# Patient Record
Sex: FEMALE | Race: WHITE
[De-identification: names, ages, dates, MRNs, and addresses within clinical notes are randomized per-mention and may not be internally consistent; named-entity substitution may affect disease eponyms.]

---

## 2019-01-05 ENCOUNTER — HOSPITAL ENCOUNTER (EMERGENCY)
Dept: HOSPITAL 63 - ER | Age: 24
Discharge: HOME | End: 2019-01-05
Payer: COMMERCIAL

## 2019-01-05 VITALS — BODY MASS INDEX: 20.94 KG/M2 | HEIGHT: 66 IN | WEIGHT: 130.29 LBS

## 2019-01-05 VITALS — DIASTOLIC BLOOD PRESSURE: 83 MMHG | SYSTOLIC BLOOD PRESSURE: 131 MMHG

## 2019-01-05 DIAGNOSIS — O36.4XX0: Primary | ICD-10-CM

## 2019-01-05 DIAGNOSIS — Z3A.10: ICD-10-CM

## 2019-01-05 LAB
BASOPHILS # BLD AUTO: 0 X10^3/UL (ref 0–0.2)
BASOPHILS NFR BLD: 0 % (ref 0–3)
EOSINOPHIL NFR BLD: 0 % (ref 0–3)
EOSINOPHIL NFR BLD: 0 X10^3/UL (ref 0–0.7)
ERYTHROCYTE [DISTWIDTH] IN BLOOD BY AUTOMATED COUNT: 12.9 % (ref 11.5–14.5)
HCT VFR BLD CALC: 38.3 % (ref 36–47)
HGB BLD-MCNC: 13.1 G/DL (ref 12–15.5)
LYMPHOCYTES # BLD: 1.7 X10^3/UL (ref 1–4.8)
LYMPHOCYTES NFR BLD AUTO: 22 % (ref 24–48)
MCH RBC QN AUTO: 30 PG (ref 25–35)
MCHC RBC AUTO-ENTMCNC: 34 G/DL (ref 31–37)
MCV RBC AUTO: 89 FL (ref 79–100)
MONO #: 0.4 X10^3/UL (ref 0–1.1)
MONOCYTES NFR BLD: 5 % (ref 0–9)
NEUT #: 5.7 X10^3UL (ref 1.8–7.7)
NEUTROPHILS NFR BLD AUTO: 73 % (ref 31–73)
PLATELET # BLD AUTO: 217 X10^3/UL (ref 140–400)
RBC # BLD AUTO: 4.3 X10^6/UL (ref 3.5–5.4)
WBC # BLD AUTO: 7.9 X10^3/UL (ref 4–11)

## 2019-01-05 PROCEDURE — 85025 COMPLETE CBC W/AUTO DIFF WBC: CPT

## 2019-01-05 PROCEDURE — 84702 CHORIONIC GONADOTROPIN TEST: CPT

## 2019-01-05 PROCEDURE — 76805 OB US >/= 14 WKS SNGL FETUS: CPT

## 2019-01-05 PROCEDURE — 36415 COLL VENOUS BLD VENIPUNCTURE: CPT

## 2019-01-05 PROCEDURE — 86901 BLOOD TYPING SEROLOGIC RH(D): CPT

## 2019-01-05 PROCEDURE — 86900 BLOOD TYPING SEROLOGIC ABO: CPT

## 2019-01-05 PROCEDURE — 76817 TRANSVAGINAL US OBSTETRIC: CPT

## 2019-01-05 NOTE — RAD
Obstetrical ultrasound

 

HISTORY: Vaginal bleeding for one day. Low back pain for one day. Pregnant

with estimated clinical age of 14 weeks 4 days.

 

FINDINGS:

Uterus measures 9.4 x 3.4 x 5.9 cm. Gestational sac identified with a 

fetal pole. Crown rump length measurement is consistent with a fetal age 

of 10 weeks 5 days. Despite careful observation, fetal heart tones cannot 

be detected.

 

Maternal ovaries are not visualized.

 

IMPRESSION: Intrauterine fetus identified with crown-rump length 

measurements corresponding with 10 weeks 5 days. However, fetal heart 

tones cannot be detected despite careful observation. Findings are 

therefore concerning for failed pregnancy or fetal demise. Correlate 

clinically and with quantitative beta hCG values.

 

Electronically signed by: Geovanny Metcalf MD (1/5/2019 12:58 PM) [facility]

## 2019-01-05 NOTE — PHYS DOC
Past History


Past Medical History:  No Pertinent History


Past Surgical History:  


Smoking:  Non-smoker


Alcohol Use:  None


Drug Use:  None





Adult General


Chief Complaint


Chief Complaint:  VAGINAL BLEEDING PREGNANCY





HPI


HPI





Patient is a 23 year old female who presents with complaining of vaginal 

bleeding during pregnancy. Patient is  A1 at 14 weeks of gestation with 

LMP of 2018 with complaining of small amount of vaginal bleeding 

after intercourse this morning abdominal pain, nausea and vomiting, fever and 

chills, urinary symptom. Patient denies history of vaginal bleeding during her 

pregnancy. Patient moved from [place] to this area and has appointment with 

new OB/GYN on  care. Patient had ultrasound in [place] several 

weeks ago with intrauterine gestational sac. She does not know about her blood 

type.





Review of Systems


Review of Systems





Constitutional: Denies fever or chills []


Eyes: Denies change in visual acuity, redness, or eye pain []


HENT: Denies nasal congestion or sore throat []


Respiratory: Denies cough or shortness of breath []


Cardiovascular: No additional information not addressed in HPI []


GI: Denies abdominal pain, nausea, vomiting, bloody stools or diarrhea []


: Denies dysuria or hematuria []


Musculoskeletal: Denies back pain or joint pain []


Integument: Denies rash or skin lesions []


Neurologic: Denies headache, focal weakness or sensory changes []


Endocrine: Denies polyuria or polydipsia []





All other systems were reviewed and found to be within normal limits, except as 

documented in this note.





Allergies


Allergies





Allergies








Coded Allergies Type Severity Reaction Last Updated Verified


 


  No Known Drug Allergies    19 No











Physical Exam


Physical Exam





Constitutional: Well developed, well nourished, no acute distress, non-toxic 

appearance. []


HENT: Normocephalic, atraumatic. []


Eyes: PERRLA, EOMI, conjunctiva normal, no discharge. [] 


Neck: Normal range of motion, no tenderness, supple, no stridor. [] 


Cardiovascular:Heart rate regular rhythm, no murmur []


Lungs & Thorax:  Bilateral breath sounds clear to auscultation []


Abdomen: Bowel sounds normal, soft, no tenderness, no masses, no pulsatile 

masses. Vaginal exam in present of chaperone showed normal external vagina, 

brownish discharge in vagina without tenderness.[] 


Skin: Warm, dry, no erythema, no rash. [] 


Back: No tenderness, no CVA tenderness. [] 


Extremities: No tenderness, no cyanosis, no clubbing, ROM intact, no edema. [] 


Neurologic: Alert and oriented X 3, normal motor function, normal sensory 

function, no focal deficits noted. []


Psychologic: Affect normal, judgement normal, mood normal. []





Current Patient Data


Vital Signs





 Vital Signs








  Date Time  Temp Pulse Resp B/P (MAP) Pulse Ox O2 Delivery O2 Flow Rate FiO2


 


19 11:12   20  98 Room Air  


 


19 10:24 98.4 83      








Lab Results





 Laboratory Tests








Test


 19


10:35


 


White Blood Count


 7.9 x10^3/uL


(4.0-11.0)


 


Red Blood Count


 4.30 x10^6/uL


(3.50-5.40)


 


Hemoglobin


 13.1 g/dL


(12.0-15.5)


 


Hematocrit


 38.3 %


(36.0-47.0)


 


Mean Corpuscular Volume


 89 fL ()





 


Mean Corpuscular Hemoglobin 30 pg (25-35)  


 


Mean Corpuscular Hemoglobin


Concent 34 g/dL


(31-37)


 


Red Cell Distribution Width


 12.9 %


(11.5-14.5)


 


Platelet Count


 217 x10^3/uL


(140-400)


 


Neutrophils (%) (Auto) 73 % (31-73)  


 


Lymphocytes (%) (Auto) 22 % (24-48)  L


 


Monocytes (%) (Auto) 5 % (0-9)  


 


Eosinophils (%) (Auto) 0 % (0-3)  


 


Basophils (%) (Auto) 0 % (0-3)  


 


Neutrophils # (Auto)


 5.7 x10^3uL


(1.8-7.7)


 


Lymphocytes # (Auto)


 1.7 x10^3/uL


(1.0-4.8)


 


Monocytes # (Auto)


 0.4 x10^3/uL


(0.0-1.1)


 


Eosinophils # (Auto)


 0.0 x10^3/uL


(0.0-0.7)


 


Basophils # (Auto)


 0.0 x10^3/uL


(0.0-0.2)


 


Maternal Serum HCG Beta


Subunit 5591 mIU/mL


(0-6)  H











EKG


EKG


[]





Radiology/Procedures


Radiology/Procedures


 [facility] [address], [place] [address]


 [phone]


 


 IMAGING REPORT





 Signed





PATIENT: NADINE FOX ACCOUNT: [id number] MRN#: [id number]


: 1995 LOCATION: ER AGE: 23


SEX: F EXAM DT: 19 ACCESSION#: [id number]


STATUS: REG ER ORD. PHYSICIAN: PAUL JOSEPH MD 


REASON: vaginal bleeding


PROCEDURE: OB > 14 WKS W/TV





Obstetrical ultrasound


 


HISTORY: Vaginal bleeding for one day. Low back pain for one day. Pregnant


with estimated clinical age of 14 weeks 4 days.


 


FINDINGS:


Uterus measures 9.4 x 3.4 x 5.9 cm. Gestational sac identified with a 


fetal pole. Crown rump length measurement is consistent with a fetal age 


of 10 weeks 5 days. Despite careful observation, fetal heart tones cannot 


be detected.


 


Maternal ovaries are not visualized.


 


IMPRESSION: Intrauterine fetus identified with crown-rump length 


measurements corresponding with 10 weeks 5 days. However, fetal heart 


tones cannot be detected despite careful observation. Findings are 


therefore concerning for failed pregnancy or fetal demise. Correlate 


clinically and with quantitative beta hCG values.


 


Electronically signed by: Geovanny Metcalf MD (2019 12:58 PM) [facility]














DICTATED AND SIGNED BY:     GEOVANNY METCALF MD


DATE:     19 1253





CC: PAUL JOSEPH MD; PCP,NO ~





Course & Med Decision Making


Course & Med Decision Making


Pertinent Labs and Imaging studies reviewed. (See chart for details)





Evaluation of patient in ER showed 23-year-old male patient with vaginal 

bleeding at 14 weeks of gestation. Patient had blood type of A+ with a stable 

vital signs and hemoglobin. OB ultrasound did not show total heart rate with 

concern for fetal demise. Patient's on-call OB/GYN Dr Kumari was consulted at 

1306 and recommended to discharge patient from our care and patient following 

up with him today at [facility] for D&C. Verbal report was 

given to Dr. Jg BHATIA on-call at [facility] at 1319. 

Patient discharged at stable condition and recommended to go to [facility] without eating or drinking anything.





Dragon Disclaimer


Dragon Disclaimer


This electronic medical record was generated, in whole or in part, using a 

voice recognition dictation system.





Departure


Departure:


Impression:  


 Primary Impression:  


 Fetal demise before 20 weeks with retention of dead fetus


Disposition:   HOME, SELF-CARE (at 1321 to [facility])


Condition:  STABLE


Referrals:  


PCP,NO (PCP)


Patient Instructions:  Intrauterine Fetal Demise





Additional Instructions:  


Follow-up with [facility] emergency room now for  D&C by 

Dr. Kumari


Do not eat or drink anything











PAUL JOSEPH MD 2019 12:44

## 2019-03-29 ENCOUNTER — HOSPITAL ENCOUNTER (EMERGENCY)
Dept: HOSPITAL 63 - ER | Age: 24
Discharge: HOME | End: 2019-03-29
Payer: COMMERCIAL

## 2019-03-29 VITALS — BODY MASS INDEX: 19.77 KG/M2 | HEIGHT: 66 IN | WEIGHT: 123 LBS

## 2019-03-29 VITALS — SYSTOLIC BLOOD PRESSURE: 106 MMHG | DIASTOLIC BLOOD PRESSURE: 57 MMHG
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
## 2019-03-29 DIAGNOSIS — S61.217A: Primary | ICD-10-CM
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
## 2019-03-29 PROCEDURE — 12001 RPR S/N/AX/GEN/TRNK 2.5CM/<: CPT

## 2019-03-29 PROCEDURE — 99283 EMERGENCY DEPT VISIT LOW MDM: CPT

## 2019-03-29 NOTE — PHYS DOC
Past History


Past Medical History:  No Pertinent History


Past Surgical History:  


Smoking:  Non-smoker


Alcohol Use:  None


Drug Use:  None





Adult General


Chief Complaint


Chief Complaint:  LACERATION/AVULSION





HPI


HPI


23-year-old female presents with left little finger laceration. The patient was 

using a knife when she started with the cutting board the knife slipped and 

lacerated her left little finger. It was a significant cut immediately started 

to bleed. She is concerned she may need sutures so she came to the emergency 

room. She denies any other injuries. Her last tetanus vaccine was 2 years ago.





Review of Systems


Review of Systems





Constitutional: Denies fever or chills []


Eyes: Denies change in visual acuity, redness, or eye pain []


HENT: Denies nasal congestion or sore throat []


Respiratory: Denies cough or shortness of breath []


Cardiovascular: No additional information not addressed in HPI []


GI: Denies abdominal pain, nausea, vomiting, bloody stools or diarrhea []


: Denies dysuria or hematuria []


Musculoskeletal: Denies back pain or joint pain []


Integument: Laceration of the left little finger[]


Neurologic: Denies headache, focal weakness or sensory changes []


Endocrine: Denies polyuria or polydipsia []





All other systems were reviewed and found to be within normal limits, except as 

documented in this note.





Current Medications


Current Medications





Current Medications








 Medications


  (Trade)  Dose


 Ordered  Sig/Romain  Start Time


 Stop Time Status Last Admin


Dose Admin


 


 Ondansetron HCl


  (Zofran Odt)  4 mg  1X  ONCE  3/29/19 09:30


 3/29/19 09:31 DC 3/29/19 09:24


4 MG











Allergies


Allergies





Allergies








Coded Allergies Type Severity Reaction Last Updated Verified


 


  No Known Drug Allergies    19 No











Physical Exam


Physical Exam





Constitutional: Well developed, well nourished, no acute distress, non-toxic 

appearance. []


HENT: Normocephalic, atraumatic, bilateral external ears normal, oropharynx 

moist, no oral exudates, nose normal. []


Eyes: PERRLA, EOMI, conjunctiva normal, no discharge. [] 


Neck: Normal range of motion, no tenderness, supple, no stridor. [] 


Cardiovascular:Heart rate regular rhythm, no murmur []


Lungs & Thorax:  Bilateral breath sounds clear to auscultation []


Abdomen: Bowel sounds normal, soft, no tenderness, no masses, no pulsatile 

masses. [] 


Skin: One centimeter semicircular laceration of the left little finger volar 

side.[] 


Back: No tenderness, no CVA tenderness. [] 


Extremities: No tenderness, no cyanosis, no clubbing, ROM intact, no edema. [] 


Neurologic: Alert and oriented X 3, normal motor function, normal sensory 

function, no focal deficits noted. []


Psychologic: Affect normal, judgement normal, mood normal. []





Current Patient Data


Vital Signs





 Vital Signs








  Date Time  Temp Pulse Resp B/P (MAP) Pulse Ox O2 Delivery O2 Flow Rate FiO2


 


3/29/19 08:37 97.9 55 16  99 Room Air  











EKG


EKG


[]





Radiology/Procedures


Radiology/Procedures


[]





Course & Med Decision Making


Course & Med Decision Making


Pertinent Labs and Imaging studies reviewed. (See chart for details)


The patient's laceration was a near avulsion of the skin. This flap is highly 

unlikely to survive. I did Dermabond down the edges of the flap for protection 

of the underlying bed. He repair note for more details. The patient was 

nauseous and she was given 4 mg of Zofran ODT. She is stable for discharge at 

this time.


[]





Dragon Disclaimer


Dragon Disclaimer


This electronic medical record was generated, in whole or in part, using a 

voice recognition dictation system.





Laceration Repair


Lac Repair


Indication: []Semicircular laceration of the left little finger





Procedure: Verbal consent was obtained to attempt to repair this laceration if 

indicated. I used 1% lidocaine without epinephrine to numb the area. Once 

anesthesia was achieved, more thorough irrigation of the area was performed 

with saline under pressure. No foreign bodies were found.  The flap was 

unlikely to be viable, but would provide protection for the wound. I applied 

Dermabond to the edges of the wound to help keep it in place. Bleeding was 

controlled. A pressure dressing was applied. 





Total repaired wound length: 1 Centimeter. 





Other Items: None





The patient tolerated the procedure well.





Complications: Nausea, but no vomiting.





Departure


Departure:


Impression:  


 Primary Impression:  


 Laceration of left little finger


Disposition:   HOME, SELF-CARE


Condition:  IMPROVED


Referrals:  


PCP,NO (PCP)


Patient Instructions:  Fingertip Laceration





Problem Qualifiers








 Primary Impression:  


 Laceration of left little finger


 Encounter type:  initial encounter  Damage to nail status:  without damage  

Foreign body presence:  without foreign body  Qualified Codes:  S61.217A - 

Laceration without foreign body of left little finger without damage to nail, 

initial encounter








SATNAM MISHRA DO Mar 29, 2019 09:37

## 2021-07-12 ENCOUNTER — HOSPITAL ENCOUNTER (EMERGENCY)
Dept: HOSPITAL 63 - ER | Age: 26
Discharge: HOME | End: 2021-07-12
Payer: COMMERCIAL

## 2021-07-12 VITALS — SYSTOLIC BLOOD PRESSURE: 130 MMHG | DIASTOLIC BLOOD PRESSURE: 79 MMHG

## 2021-07-12 VITALS — WEIGHT: 122.58 LBS | BODY MASS INDEX: 19.7 KG/M2 | HEIGHT: 66 IN

## 2021-07-12 DIAGNOSIS — B34.9: ICD-10-CM

## 2021-07-12 DIAGNOSIS — U07.1: Primary | ICD-10-CM

## 2021-07-12 LAB
% BANDS: 9 % (ref 0–9)
% LYMPHS: 50 % (ref 24–48)
% METAS: 1 % (ref 0–0)
% MONOS: 12 % (ref 0–10)
% MYELOS: 1 % (ref 0–0)
% SEGS: 25 % (ref 35–66)
ANION GAP SERPL CALC-SCNC: 10 MMOL/L (ref 6–14)
BASOPHILS # BLD AUTO: 0 X10^3/UL (ref 0–0.2)
BASOPHILS NFR BLD: 1 % (ref 0–3)
CA-I SERPL ISE-MCNC: 10 MG/DL (ref 7–20)
CALCIUM SERPL-MCNC: 8.3 MG/DL (ref 8.5–10.1)
CHLORIDE SERPL-SCNC: 106 MMOL/L (ref 98–107)
CO2 SERPL-SCNC: 26 MMOL/L (ref 21–32)
CREAT SERPL-MCNC: 0.8 MG/DL (ref 0.6–1)
EOSINOPHIL NFR BLD AUTO: 2 % (ref 0–5)
EOSINOPHIL NFR BLD: 0 X10^3/UL (ref 0–0.7)
EOSINOPHIL NFR BLD: 1 % (ref 0–3)
ERYTHROCYTE [DISTWIDTH] IN BLOOD BY AUTOMATED COUNT: 14.3 % (ref 11.5–14.5)
GFR SERPLBLD BASED ON 1.73 SQ M-ARVRAT: 88.9 ML/MIN
GLUCOSE SERPL-MCNC: 99 MG/DL (ref 70–99)
HCT VFR BLD CALC: 37.2 % (ref 36–47)
HGB BLD-MCNC: 12.1 G/DL (ref 12–15.5)
LYMPHOCYTES # BLD: 1 X10^3/UL (ref 1–4.8)
LYMPHOCYTES NFR BLD AUTO: 46 % (ref 24–48)
MCH RBC QN AUTO: 28 PG (ref 25–35)
MCHC RBC AUTO-ENTMCNC: 32 G/DL (ref 31–37)
MCV RBC AUTO: 88 FL (ref 79–100)
MONO #: 0.2 X10^3/UL (ref 0–1.1)
MONOCYTES NFR BLD: 12 % (ref 0–9)
NEUT #: 0.9 X10^3UL (ref 1.8–7.7)
NEUTROPHILS NFR BLD AUTO: 41 % (ref 31–73)
PLATELET # BLD AUTO: 154 X10^3/UL (ref 140–400)
PLATELET # BLD EST: ADEQUATE 10*3/UL
POTASSIUM SERPL-SCNC: 4.1 MMOL/L (ref 3.5–5.1)
RBC # BLD AUTO: 4.25 X10^6/UL (ref 3.5–5.4)
SODIUM SERPL-SCNC: 142 MMOL/L (ref 136–145)
WBC # BLD AUTO: 2.1 X10^3/UL (ref 4–11)

## 2021-07-12 PROCEDURE — 80048 BASIC METABOLIC PNL TOTAL CA: CPT

## 2021-07-12 PROCEDURE — 85007 BL SMEAR W/DIFF WBC COUNT: CPT

## 2021-07-12 PROCEDURE — U0003 INFECTIOUS AGENT DETECTION BY NUCLEIC ACID (DNA OR RNA); SEVERE ACUTE RESPIRATORY SYNDROME CORONAVIRUS 2 (SARS-COV-2) (CORONAVIRUS DISEASE [COVID-19]), AMPLIFIED PROBE TECHNIQUE, MAKING USE OF HIGH THROUGHPUT TECHNOLOGIES AS DESCRIBED BY CMS-2020-01-R: HCPCS

## 2021-07-12 PROCEDURE — 85025 COMPLETE CBC W/AUTO DIFF WBC: CPT

## 2021-07-12 PROCEDURE — C9803 HOPD COVID-19 SPEC COLLECT: HCPCS

## 2021-07-12 PROCEDURE — 71046 X-RAY EXAM CHEST 2 VIEWS: CPT

## 2021-07-12 PROCEDURE — 99284 EMERGENCY DEPT VISIT MOD MDM: CPT

## 2021-07-12 NOTE — RAD
EXAM: Chest, 2 views.



HISTORY: Cough.



COMPARISON: None.



FINDINGS: 2 views of the chest are obtained. There is no infiltrate, pleural effusion or pneumothorax
. The heart is normal in size.



IMPRESSION: No acute pulmonary finding.



Electronically signed by: Radha Chen MD (7/12/2021 1:01 PM) [id number]

## 2021-07-12 NOTE — PHYS DOC
Past History


Past Medical History:  No Pertinent History


Past Surgical History:  


Smoking:  Non-smoker


Alcohol Use:  None


Drug Use:  None





General Adult


EDM:


Chief Complaint:  COUGH





HPI:


HPI:


Patient is a 25-year-old female being seen in the ER today for nonproductive 

cough, headache, high pain, body aches, loss of smell that started 4 days ago.  

Patient rates her pain 6 out of 10.  She took Advil last night but no treatment 

today.  Patient is not vaccinated for COVID-19.  She is a  so she 

is unsure of any sick exposures.  Patient states that her father is a paramedic 

and her that she need to be tested for meningitis because she has got a headache

and body aches.  Patient denies nasal congestion drainage, neck stiffness, 

shortness of breath, sore throat, loss of taste, fevers.





Review of Systems:


Review of Systems:


14 body systems of the review of systems have been reviewed.  See HPI for pe

rtinent positive and negative responses, otherwise all other systems are 

negative, nonpertinent or noncontributory





Allergies:


Allergies:





Allergies








Coded Allergies Type Severity Reaction Last Updated Verified


 


  No Known Drug Allergies    19 No











Physical Exam:


PE:





Constitutional: Well developed, well nourished, no acute distress, non-toxic 

appearance. []


HENT: Normocephalic, atraumatic, bilateral external ears normal, oropharynx 

moist with mild erythema, postnasal drainage, no oral exudates, nose normal, no 

pain with palpation of sinuses. []


Eyes: PERRLA, EOMI, conjunctiva normal, no discharge. [] 


Neck: Normal range of motion, no tenderness, supple, no stridor, no cervical 

lymphadenopathy. [] 


Cardiovascular:Heart rate regular rhythm, no murmur []


Lungs & Thorax:  Bilateral breath sounds clear to auscultation []


Abdomen: Bowel sounds normal, soft, no tenderness, no masses, no pulsatile 

masses. [] 


Skin: Warm, dry, no erythema, no rash. [] 


Back: No tenderness, normal range of motion [] 


Extremities: No tenderness, no cyanosis, no clubbing, ROM intact, no edema. [] 


Neurologic: Alert and oriented X 3, normal motor function, normal sensory 

function, no focal deficits noted. []


Psychologic: Affect normal, judgement normal, mood normal. []





Current Patient Data:


Labs:





Laboratory Tests








Test


 21


13:05


 


White Blood Count 2.1 x10^3/uL 


 


Red Blood Count 4.25 x10^6/uL 


 


Hemoglobin 12.1 g/dL 


 


Hematocrit 37.2 % 


 


Mean Corpuscular Volume 88 fL 


 


Mean Corpuscular Hemoglobin 28 pg 


 


Mean Corpuscular Hemoglobin


Concent 32 g/dL 





 


Red Cell Distribution Width 14.3 % 


 


Platelet Count 154 x10^3/uL 


 


Neutrophils (%) (Auto) 41 % 


 


Lymphocytes (%) (Auto) 46 % 


 


Monocytes (%) (Auto) 12 % 


 


Eosinophils (%) (Auto) 1 % 


 


Basophils (%) (Auto) 1 % 


 


Neutrophils # (Auto) 0.9 x10^3uL 


 


Lymphocytes # (Auto) 1.0 x10^3/uL 


 


Monocytes # (Auto) 0.2 x10^3/uL 


 


Eosinophils # (Auto) 0.0 x10^3/uL 


 


Basophils # (Auto) 0.0 x10^3/uL 


 


Platelet Estimate Pending 


 


Sodium Level 142 mmol/L 


 


Potassium Level 4.1 mmol/L 


 


Chloride Level 106 mmol/L 


 


Carbon Dioxide Level 26 mmol/L 


 


Anion Gap 10 


 


Blood Urea Nitrogen 10 mg/dL 


 


Creatinine 0.8 mg/dL 


 


Estimated GFR


(Cockcroft-Gault) 88.9 





 


Glucose Level 99 mg/dL 


 


Calcium Level 8.3 mg/dL 











EKG:


EKG:


[]





Radiology/Procedures:


Radiology/Procedures:


PROCEDURE: CHEST PA & LATERAL





EXAM: Chest, 2 views.





HISTORY: Cough.





COMPARISON: None.





FINDINGS: 2 views of the chest are obtained. There is no infiltrate, pleural 

effusion or pneumothorax. The heart is normal in size.





IMPRESSION: No acute pulmonary finding.





Electronically signed by: Radha Chen MD (2021 1:01 PM) [id number]














DICTATED AND SIGNED BY:     RADHA CHEN MD


DATE:     21 1307





CC: EMERGENCY,DEPARTMENT; JOSEFA CHÁVEZ APRN; PCP,NO ~MTH0 0





Heart Score:


C/O Chest Pain:  No


Risk Factors:


Risk Factors:  DM, Current or recent (<one month) smoker, HTN, HLP, family 

history of CAD, obesity.


Risk Scores:


Score 0 - 3:  2.5% MACE over next 6 weeks - Discharge Home


Score 4 - 6:  20.3% MACE over next 6 weeks - Admit for Clinical Observation


Score 7 - 10:  72.7% MACE over next 6 weeks - Early Invasive Strategies





Course & Med Decision Making:


Course & Med Decision Making


Pertinent Labs and Imaging studies reviewed. (See chart for details)





Patient is a 25-year-old female being seen in the ER today for nonproductive 

cough, headache, eye pain, loss of smell, body aches.  Patient reports that she 

told her father who is a paramedic that she has a headache and body aches and he

 told her to be tested for meningitis.  Patient has no meningeal symptoms.  No 

fever, neck stiffness, altered mental status, nausea, vomiting, focal neuro 

deficit, nuchal rigidity.  It is likely that patient has COVID-19 viral illness.

  Patient tested for COVID-19 and will receive the results of that testing in 1 

to 2 days.  Blood work also obtained, this was mostly unremarkable with findings

 consistent with a viral illness.  Patient had a chest x-ray performed and that 

was negative for any acute findings.  I discussed with patient the possibility 

of a COVID-19 viral illness.  Self-isolation was discussed and patient referred 

to CDC guidelines.  I discussed with patient all findings and diagnostic testing

 as well as the need to follow-up with PCP for further evaluation and treatment 

or return to the ER if any new or worsening symptoms.  Strict return precautions

 were also discussed at length.  Patient voiced understanding and agreement with

 the plan.  Patient is hemodynamically stable at the time of disposition.





Dragon Disclaimer:


Dragon Disclaimer:


This electronic medical record was generated, in whole or in part, using a voice

 recognition dictation system.





Departure


Departure:


Impression:  


   Primary Impression:  


   Viral illness


Disposition:   HOME / SELF CARE / HOMELESS


Condition:  GOOD


Referrals:  


PCP,NO (PCP)


Patient Instructions:  Cough, Adult





Additional Instructions:  


You were seen in the ER today for headache, eye pain, body aches, and cough.  

Your physical exam was reassuring.  Your chest x-ray was normal.  Your blood 

work was unremarkable but did show indication of a viral illness.  You can take 

Tylenol or ibuprofen for your headache.  We tested you for COVID-19 but this 

test does not come back for 1 to 2 days.  In the meantime you will need to 

quarantine yourself at home away from all other individuals, especially those 

who are elderly or have any other chronic health issues or any one with 

immunocompromise status.  You should return to the ER if you develop worsening 

cough, shortness of breath, chest pain, or any other new or concerning symptoms.

  Alternate Tylenol and ibuprofen as needed for your body aches and pain.  If 

your test does come back positive we will need to quarantine yourself for 10 

days until symptom free.  You should make sure to drink plenty of fluids and get

 plenty of rest.











JOSEFA CHÁVEZ APRN          2021 12:43

## 2021-11-14 ENCOUNTER — HOSPITAL ENCOUNTER (EMERGENCY)
Dept: HOSPITAL 63 - ER | Age: 26
Discharge: HOME | End: 2021-11-14
Payer: COMMERCIAL

## 2021-11-14 VITALS — SYSTOLIC BLOOD PRESSURE: 126 MMHG | DIASTOLIC BLOOD PRESSURE: 68 MMHG

## 2021-11-14 VITALS — HEIGHT: 66 IN | WEIGHT: 122.58 LBS | BODY MASS INDEX: 19.7 KG/M2
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
## 2021-11-14 DIAGNOSIS — S60.041A: Primary | ICD-10-CM
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
## 2021-11-14 PROCEDURE — 73130 X-RAY EXAM OF HAND: CPT

## 2021-11-14 PROCEDURE — 99283 EMERGENCY DEPT VISIT LOW MDM: CPT

## 2021-11-14 NOTE — RAD
Right hand 3 views.



HISTORY: Smashed in car door, pain



3 views were taken of the right hand. There is not evidence of an acute fracture or osseous abnormali
ty.



IMPRESSION:

1. No acute fracture noted in the right hand.



Electronically signed by: Alejandro Wilson MD (11/14/2021 2:50 PM) [facility]

## 2021-11-14 NOTE — PHYS DOC
Past History


Past Medical History:  No Pertinent History


 (JOSHUA WHITT APRN)


Past Surgical History:  , Other


Additional Past Surgical Histo:  D&C X 2


 (JOSHUA WHITT APRN)


Smoking:  Non-smoker


Alcohol Use:  None


Drug Use:  None


 (JOSHUA WHITT)





Adult General


Chief Complaint


Chief Complaint:  HAND PROBLEM





HPI


HPI





Patient is a 26-year-old female  who presents to the ED today 

complaining of right eye injury, patient states she accidentally smashed her 

right hand in a car door.  Patient states she is right-handed.


 (JOSHUA WHITT APRN)





Review of Systems


Review of Systems





Constitutional: Denies fever or chills 


Musculoskeletal: Reports right hand intermittent


Integument: Denies rash or skin lesions []


Neurologic: Denies headache, focal weakness or sensory changes []








All other systems were reviewed and found to be within normal limits, except as 

documented in this note.


 (JOSHUA WHITT APRFAYE)





Allergies


Allergies





Allergies








Coded Allergies Type Severity Reaction Last Updated Verified


 


  No Known Drug Allergies    19 No








 (JOSHUA WHITT)





Physical Exam


Physical Exam





Constitutional: Well developed, well nourished, no acute distress, non-toxic 

appearance. []


Skin: Warm, dry, no erythema, no rash. [] 


Back: No tenderness, no CVA tenderness. [] 


Extremities: Right hand with no obvious deformity, bruising noted on the right 

ring finger proximal phalanx with tenderness over the region.  Full range of 

motion to the right hand and fingers, adequate radial, medial, ulnar sensation 

to the right fingers.  +2 right radial pulse.  Cap refill less than 2 seconds to

 right fingers


Neurologic: Alert and oriented X 3, normal motor function, normal sensory 

function, no focal deficits noted. []


Psychologic: Affect normal, judgement normal, mood normal. []


 (JOSHUA WHITT APRN)





EKG


EKG


[]


 (JOSHUA WHITT APRFAYE)





Radiology/Procedures


Radiology/Procedures


[]PROCEDURE: HAND RIGHT 3V





Right hand 3 views.





HISTORY: Smashed in car door, pain





3 views were taken of the right hand. There is not evidence of an acute fracture

 or osseous abnormality.





IMPRESSION:


1. No acute fracture noted in the right hand.





Electronically signed by: Alejandro Wilson MD (2021 2:50 PM) [facility]














DICTATED AND SIGNED BY:     ALEJANDRO WILSON MD


DATE:     21 1449





CC: JOSHUA WHITT; PCP,NO ~MTH0 0


 (JOSHUA WHITT)





Heart Score


C/O Chest Pain:  N/A


Risk Factors:


Risk Factors:  DM, Current or recent (<one month) smoker, HTN, HLP, family 

history of CAD, obesity.


Risk Scores:


Risk Factors:  DM, Current or recent (<one month) smoker, HTN, HLP, family 

history of CAD, obesity.


 (JOSHUA WHITT)





Course & Med Decision Making


Course & Med Decision Making


Pertinent Labs and Imaging studies reviewed. (See chart for details)





This is a 26-year-old female patient presenting to the ED today complaining of 

right hand injury, she accidentally smashed her right hand in a car door.





Right hand x-rays interpreted by radiologist as negative for any acute findings.

  Discharge to home.  Follow-up with Ortho in 1 week.  Ice elevation encouraged.

  OTC pain relievers





 (JOSHUA WHITT)


Course & Med Decision Making


I was the Attending physician on the above date of service of this patient. This

 patient was evaluated, examined, treated, and dispositioned from the emergency 

department by the mid-level practitioner.  Although I was working at the time , 

no assistance was requested. 





Electronically signed, Thelma Mccullough DO





 (THELMA MCCULLOUGH DO)


Yuri Disclaimer


Dragon Disclaimer


This electronic medical record was generated, in whole or in part, using a voice

 recognition dictation system.


 (JOSHUA WHITT)





Departure


Departure:


Impression:  


   Primary Impression:  


   Contusion of right hand


Disposition:   HOME / SELF CARE / HOMELESS


Condition:  STABLE


Referrals:  


PCP,NO (PCP)








ANU KING Jr. DO


follow up in 1-2 weeks


Patient Instructions:  Contusion, Easy-to-Read





Additional Instructions:  


You were seen for right had injury, your right hand x-rays are negative for any 

acute findings.  Please try to ice and elevate the extremity.  Take 

over-the-counter pain relievers as needed.  Please follow-up with your own 

primary care doctor or the provided orthopedic doctor in 1 week if pain persists





Problem Qualifiers








   Primary Impression:  


   Contusion of right hand


   Encounter type:  initial encounter  Qualified Codes:  S60.221A - Contusion of

    right hand, initial encounter








JOSHUA WHITT            2021 14:26


THELMA MCCULLOUGH DO                 2021 06:57

## 2021-12-24 ENCOUNTER — HOSPITAL ENCOUNTER (EMERGENCY)
Dept: HOSPITAL 63 - ER | Age: 26
Discharge: ANOTHER HOSPITAL | End: 2021-12-24
Payer: COMMERCIAL

## 2021-12-24 VITALS — HEIGHT: 66 IN | BODY MASS INDEX: 19.31 KG/M2 | WEIGHT: 120.15 LBS

## 2021-12-24 VITALS — SYSTOLIC BLOOD PRESSURE: 101 MMHG | DIASTOLIC BLOOD PRESSURE: 94 MMHG

## 2021-12-24 DIAGNOSIS — Z20.822: ICD-10-CM

## 2021-12-24 DIAGNOSIS — Z98.890: ICD-10-CM

## 2021-12-24 DIAGNOSIS — Z3A.01: ICD-10-CM

## 2021-12-24 DIAGNOSIS — R10.84: ICD-10-CM

## 2021-12-24 DIAGNOSIS — O26.891: Primary | ICD-10-CM

## 2021-12-24 LAB
ALBUMIN SERPL-MCNC: 4 G/DL (ref 3.4–5)
ALBUMIN/GLOB SERPL: 1.1 {RATIO} (ref 1–1.7)
ALP SERPL-CCNC: 60 U/L (ref 46–116)
ALT SERPL-CCNC: 20 U/L (ref 14–59)
ANION GAP SERPL CALC-SCNC: 14 MMOL/L (ref 6–14)
APTT PPP: (no result) S
AST SERPL-CCNC: 16 U/L (ref 15–37)
BACTERIA #/AREA URNS HPF: (no result) /HPF
BASOPHILS # BLD AUTO: 0 X10^3/UL (ref 0–0.2)
BASOPHILS NFR BLD: 0 % (ref 0–3)
BILIRUB SERPL-MCNC: 0.8 MG/DL (ref 0.2–1)
BILIRUB UR QL STRIP: (no result)
BUN/CREAT SERPL: 20 (ref 6–20)
CA-I SERPL ISE-MCNC: 14 MG/DL (ref 7–20)
CALCIUM SERPL-MCNC: 9 MG/DL (ref 8.5–10.1)
CHLORIDE SERPL-SCNC: 100 MMOL/L (ref 98–107)
CO2 SERPL-SCNC: 24 MMOL/L (ref 21–32)
CREAT SERPL-MCNC: 0.7 MG/DL (ref 0.6–1)
EOSINOPHIL NFR BLD: 0 % (ref 0–3)
EOSINOPHIL NFR BLD: 0 X10^3/UL (ref 0–0.7)
ERYTHROCYTE [DISTWIDTH] IN BLOOD BY AUTOMATED COUNT: 13.2 % (ref 11.5–14.5)
FIBRINOGEN PPP-MCNC: (no result) MG/DL
GFR SERPLBLD BASED ON 1.73 SQ M-ARVRAT: 102.8 ML/MIN
GLOBULIN SER-MCNC: 3.5 G/DL (ref 2.2–3.8)
GLUCOSE SERPL-MCNC: 126 MG/DL (ref 70–99)
GLUCOSE UR STRIP-MCNC: (no result) MG/DL
HCT VFR BLD CALC: 39 % (ref 36–47)
HGB BLD-MCNC: 12.8 G/DL (ref 12–15.5)
HYALINE CASTS #/AREA URNS LPF: (no result) /HPF
LIPASE: 106 U/L (ref 73–393)
LYMPHOCYTES # BLD: 0.7 X10^3/UL (ref 1–4.8)
LYMPHOCYTES NFR BLD AUTO: 6 % (ref 24–48)
MCH RBC QN AUTO: 29 PG (ref 25–35)
MCHC RBC AUTO-ENTMCNC: 33 G/DL (ref 31–37)
MCV RBC AUTO: 89 FL (ref 79–100)
MONO #: 0.7 X10^3/UL (ref 0–1.1)
MONOCYTES NFR BLD: 6 % (ref 0–9)
NEUT #: 10.1 X10^3UL (ref 1.8–7.7)
NEUTROPHILS NFR BLD AUTO: 88 % (ref 31–73)
NITRITE UR QL STRIP: (no result)
PLATELET # BLD AUTO: 208 X10^3/UL (ref 140–400)
POTASSIUM SERPL-SCNC: 3.7 MMOL/L (ref 3.5–5.1)
PROT SERPL-MCNC: 7.5 G/DL (ref 6.4–8.2)
RBC # BLD AUTO: 4.38 X10^6/UL (ref 3.5–5.4)
RBC #/AREA URNS HPF: (no result) /HPF (ref 0–2)
SODIUM SERPL-SCNC: 138 MMOL/L (ref 136–145)
SP GR UR STRIP: 1.02
SQUAMOUS #/AREA URNS LPF: (no result) /LPF
UROBILINOGEN UR-MCNC: 2 MG/DL
WBC # BLD AUTO: 11.5 X10^3/UL (ref 4–11)
WBC #/AREA URNS HPF: (no result) /HPF (ref 0–4)

## 2021-12-24 PROCEDURE — 96376 TX/PRO/DX INJ SAME DRUG ADON: CPT

## 2021-12-24 PROCEDURE — 99285 EMERGENCY DEPT VISIT HI MDM: CPT

## 2021-12-24 PROCEDURE — 87086 URINE CULTURE/COLONY COUNT: CPT

## 2021-12-24 PROCEDURE — 96374 THER/PROPH/DIAG INJ IV PUSH: CPT

## 2021-12-24 PROCEDURE — 87426 SARSCOV CORONAVIRUS AG IA: CPT

## 2021-12-24 PROCEDURE — U0003 INFECTIOUS AGENT DETECTION BY NUCLEIC ACID (DNA OR RNA); SEVERE ACUTE RESPIRATORY SYNDROME CORONAVIRUS 2 (SARS-COV-2) (CORONAVIRUS DISEASE [COVID-19]), AMPLIFIED PROBE TECHNIQUE, MAKING USE OF HIGH THROUGHPUT TECHNOLOGIES AS DESCRIBED BY CMS-2020-01-R: HCPCS

## 2021-12-24 PROCEDURE — 83690 ASSAY OF LIPASE: CPT

## 2021-12-24 PROCEDURE — 76817 TRANSVAGINAL US OBSTETRIC: CPT

## 2021-12-24 PROCEDURE — C9803 HOPD COVID-19 SPEC COLLECT: HCPCS

## 2021-12-24 PROCEDURE — 96375 TX/PRO/DX INJ NEW DRUG ADDON: CPT

## 2021-12-24 PROCEDURE — 96361 HYDRATE IV INFUSION ADD-ON: CPT

## 2021-12-24 PROCEDURE — 87491 CHLMYD TRACH DNA AMP PROBE: CPT

## 2021-12-24 PROCEDURE — 87591 N.GONORRHOEAE DNA AMP PROB: CPT

## 2021-12-24 PROCEDURE — 80053 COMPREHEN METABOLIC PANEL: CPT

## 2021-12-24 PROCEDURE — 76801 OB US < 14 WKS SINGLE FETUS: CPT

## 2021-12-24 PROCEDURE — 81001 URINALYSIS AUTO W/SCOPE: CPT

## 2021-12-24 PROCEDURE — 85025 COMPLETE CBC W/AUTO DIFF WBC: CPT

## 2021-12-24 PROCEDURE — 84702 CHORIONIC GONADOTROPIN TEST: CPT

## 2021-12-24 NOTE — RAD
US OB <14 WKS +TV



Clinical Indication: Reason: LOW ABD PAIN IN PREG / Spl. Instructions:  / History: 



Comparison: Obstetric ultrasound, January 5, 2019.



TECHNIQUE: Real-time ultrasound imaging of the pelvis using transabdominal and transvaginal window is
 performed.



Findings: 

Uterus measures 8.9 x 5.5 x 5.3 cm.



There is moderate cul-de-sac free fluid. Fluid appears complicated. Outlined by free fluid dependentl
y in the cul-de-sac there is a round hyperechogenicity measuring up to 1 cm, nonspecific. Color Doppl
er interrogation is negative. The maternal ovaries are not visualized perhaps due to overlying bowel 
gas. The cervix length is adequate, the cervix is closed.



There is intrauterine gestational sac with normal shape. There is suggestion of a tiny perigestationa
l hemorrhage. In the gestational sac a yolk sac and fetal pole are identified.



Mean gestational sac diameter 1.9 cm, 7 weeks and 1 day.

Crown-rump length 1.1 cm, 6 weeks and 6 days.

Overall estimated sonographic gestational age 7 weeks and 0 days.

EDC ultrasound is August 12, 2022.

Estimated fetal heart rate is 169 bpm.



IMPRESSION:

1.  Single live intrauterine gestation, estimated sonographic gestational age is 7 weeks and 0 days.

2.  There is moderate cul-de-sac free fluid. The fluid appears complicated. There is a small round hy
perechogenicity partially outlined by free fluid. Finding is nonspecific. Recommend short-term follow
-up ultrasound.

3.  The maternal ovaries are not identified probably due to overlying bowel gas.



Electronically signed by: Dieter Shepard MD (12/24/2021 12:04 PM) [id number]

## 2021-12-24 NOTE — PHYS DOC
Past History


Past Medical History:  No Pertinent History


Past Surgical History:  , Other


Additional Past Surgical Histo:  D&C X 2


Smoking:  Non-smoker


Alcohol Use:  None


Drug Use:  None





General Adult


EDM:


Chief Complaint:  ABDOMINAL PAIN IN PREGNANCY





HPI:


HPI:





Patient is a 26-year-old female G6, P1 at approximately 7 weeks gestational age 

with generalized abdominal pain for 3 days.  Patient states is worsening.  Has 

some nausea but no vomiting.  Denies any diarrhea or constipation.  No urinary 

complaints.  Has had scant amount of yellowish vaginal discharge.





Review of Systems:


Review of Systems:


All other systems within normal limits except for as noted in the HPI





Current Medications:


Current Meds:





Current Medications








 Medications


  (Trade)  Dose


 Ordered  Sig/Romain  Start Time


 Stop Time Status Last Admin


Dose Admin


 


 Fentanyl Citrate


  (Fentanyl 2ml


 Vial)  75 mcg  1X  ONCE  21 12:45


 21 12:52 DC 21 12:57


75 MCG


 


 Ondansetron HCl


  (Zofran)  4 mg  1X  ONCE  21 09:30


 21 09:31 DC 21 10:27


4 MG


 


 Sodium Chloride  1,000 ml @ 


 1,000 mls/hr  1X  ONCE  21 12:30


 21 13:29  21 12:32


1,000 MLS/HR











Allergies:


Allergies:





Allergies








Coded Allergies Type Severity Reaction Last Updated Verified


 


  No Known Drug Allergies    21 No











Physical Exam:


PE:


Constitutional: Well developed, well nourished, no acute distress, non-toxic 

appearance. []


HENT: Normocephalic, atraumatic, bilateral external ears normal,  nose normal. 

[]


Eyes: PERRLA, conjunctiva normal, no discharge. [] 


Neck: No rigidity, supple, no stridor. [] 


Cardiovascular: Regular rate and rhythm, brisk cap refill []


Lungs & Thorax: Non labored symmetric respirations, no tachypnea or respiratory 

distress []


Abdomen: Soft, nondistended, generalized abdominal pain with guarding


Skin: Warm, dry, no erythema, no rash. [] 


Back: Unremarkable


Extremities: No deformities, range of motion grossly intact, no lower extremity 

edema [] 


Neurologic: Alert and oriented X 3, no focal deficits noted. []


Psychologic: Affect normal, judgement normal, mood normal. []





Current Patient Data:


Labs:





                                Laboratory Tests








Test


 21


10:23 21


10:24


 


White Blood Count


 11.5 x10^3/uL


(4.0-11.0)  H 





 


Red Blood Count


 4.38 x10^6/uL


(3.50-5.40) 





 


Hemoglobin


 12.8 g/dL


(12.0-15.5) 





 


Hematocrit


 39.0 %


(36.0-47.0) 





 


Mean Corpuscular Volume


 89 fL ()


 





 


Mean Corpuscular Hemoglobin 29 pg (25-35)   


 


Mean Corpuscular Hemoglobin


Concent 33 g/dL


(31-37) 





 


Red Cell Distribution Width


 13.2 %


(11.5-14.5) 





 


Platelet Count


 208 x10^3/uL


(140-400) 





 


Neutrophils (%) (Auto) 88 % (31-73)  H 


 


Lymphocytes (%) (Auto) 6 % (24-48)  L 


 


Monocytes (%) (Auto) 6 % (0-9)   


 


Eosinophils (%) (Auto) 0 % (0-3)   


 


Basophils (%) (Auto) 0 % (0-3)   


 


Neutrophils # (Auto)


 10.1 x10^3uL


(1.8-7.7)  H 





 


Lymphocytes # (Auto)


 0.7 x10^3/uL


(1.0-4.8)  L 





 


Monocytes # (Auto)


 0.7 x10^3/uL


(0.0-1.1) 





 


Eosinophils # (Auto)


 0.0 x10^3/uL


(0.0-0.7) 





 


Basophils # (Auto)


 0.0 x10^3/uL


(0.0-0.2) 





 


Maternal Serum HCG Beta


Subunit 02730 mIU/mL


(0-6)  H 





 


Sodium Level


 138 mmol/L


(136-145) 





 


Potassium Level


 3.7 mmol/L


(3.5-5.1) 





 


Chloride Level


 100 mmol/L


() 





 


Carbon Dioxide Level


 24 mmol/L


(21-32) 





 


Anion Gap 14 (6-14)   


 


Blood Urea Nitrogen


 14 mg/dL


(7-20) 





 


Creatinine


 0.7 mg/dL


(0.6-1.0) 





 


Estimated GFR


(Cockcroft-Gault) 102.8  


 





 


BUN/Creatinine Ratio 20 (6-20)   


 


Glucose Level


 126 mg/dL


(70-99)  H 





 


Calcium Level


 9.0 mg/dL


(8.5-10.1) 





 


Total Bilirubin


 0.8 mg/dL


(0.2-1.0) 





 


Aspartate Amino Transferase


(AST) 16 U/L (15-37)


 





 


Alanine Aminotransferase (ALT)


 20 U/L (14-59)


 





 


Alkaline Phosphatase


 60 U/L


() 





 


Total Protein


 7.5 g/dL


(6.4-8.2) 





 


Albumin


 4.0 g/dL


(3.4-5.0) 





 


Albumin/Globulin Ratio 1.1 (1.0-1.7)   


 


Lipase


 106 U/L


() 





 


Urine Collection Type  Void  


 


Urine Color  Radha  


 


Urine Clarity  Hazy  


 


Urine pH  7.0  


 


Urine Specific Gravity  1.020  


 


Urine Protein


 


 Trace


(NEG-TRACE)


 


Urine Glucose (UA)


 


 Neg mg/dL


(NEG)


 


Urine Ketones (Stick)


 


 >=160 mg/dL


(NEG)


 


Urine Blood  Trace (NEG)  


 


Urine Nitrite  Neg (NEG)  


 


Urine Bilirubin  Neg (NEG)  


 


Urine Urobilinogen Dipstick


 


 2.0 mg/dL (0.2


mg/dL)


 


Urine Leukocyte Esterase  Neg (NEG)  


 


Urine RBC


 


 3-5 /HPF (0-2)





 


Urine WBC


 


 1-4 /HPF (0-4)





 


Urine Squamous Epithelial


Cells 


 Mod /LPF  





 


Urine Bacteria


 


 Mod /HPF


(0-FEW)


 


Urine Hyaline Casts  Occ /HPF  


 


Urine Mucus  Marked /LPF  








Microbiology


21 Wet Prep - Final, Complete


Vital Signs:





                                   Vital Signs








  Date Time  Temp Pulse Resp B/P (MAP) Pulse Ox O2 Delivery O2 Flow Rate FiO2


 


21 12:57   18  100 Room Air  


 


21 12:31  103  98/60 (73)    


 


21 08:47 99.0       











EKG:


EKG:


[]





Radiology/Procedures:


Radiology/Procedures:


[facility] [address], [place] [address] [phone]


                                        


                                 IMAGING REPORT





                                     Signed





PATIENT: NADINE FOX    ACCOUNT: [id number]     MRN#: [id number]


: 1995           LOCATION: ER              AGE: 26


SEX: F                    EXAM DT: 21         ACCESSION#: [id number]


STATUS: REG ER            ORD. PHYSICIAN: JESSY GOMEZ MD


REASON: LOW ABD PAIN IN PREG


PROCEDURE: OB <14 WKS W/TV








US OB <14 WKS +TV





Clinical Indication: Reason: LOW ABD PAIN IN PREG / Spl. Instructions:  / 

History: 





Comparison: Obstetric ultrasound, 2019.





TECHNIQUE: Real-time ultrasound imaging of the pelvis using transabdominal and 

transvaginal window is performed.





Findings: 


Uterus measures 8.9 x 5.5 x 5.3 cm.





There is moderate cul-de-sac free fluid. Fluid appears complicated. Outlined by 

free fluid dependently in the cul-de-sac there is a round hyperechogenicity 

measuring up to 1 cm, nonspecific. Color Doppler interrogation is negative. The 

maternal ovaries are not visualized perhaps due to overlying bowel gas. The 

cervix length is adequate, the cervix is closed.





There is intrauterine gestational sac with normal shape. There is suggestion of 

a tiny perigestational hemorrhage. In the gestational sac a yolk sac and fetal 

pole are identified.





Mean gestational sac diameter 1.9 cm, 7 weeks and 1 day.


Crown-rump length 1.1 cm, 6 weeks and 6 days.


Overall estimated sonographic gestational age 7 weeks and 0 days.


EDC ultrasound is 2022.


Estimated fetal heart rate is 169 bpm.





IMPRESSION:


1.  Single live intrauterine gestation, estimated sonographic gestational age is

 7 weeks and 0 days.


2.  There is moderate cul-de-sac free fluid. The fluid appears complicated. 

There is a small round hyperechogenicity partially outlined by free fluid. 

Finding is nonspecific. Recommend short-term follow-up ultrasound.


3.  The maternal ovaries are not identified probably due to overlying bowel gas.





Electronically signed by: Dieter Shepard MD (2021 12:04 PM) [id number]














DICTATED AND SIGNED BY:     DIETER SHEPARD MD


DATE:     21 1150





CC: JESSY GOMEZ MD; NON,STAFF ~MTH0 0


[]





Heart Score:


C/O Chest Pain:  No


Risk Factors:


Risk Factors:  DM, Current or recent (<one month) smoker, HTN, HLP, family 

history of CAD, obesity.


Risk Scores:


Score 0 - 3:  2.5% MACE over next 6 weeks - Discharge Home


Score 4 - 6:  20.3% MACE over next 6 weeks - Admit for Clinical Observation


Score 7 - 10:  72.7% MACE over next 6 weeks - Early Invasive Strategies





Course & Med Decision Making:


Course & Med Decision Making


Pertinent Labs and Imaging studies reviewed. (See chart for details)


Moderate free fluid on visualized ovaries, concern for ectopic and cannot rule 

out a heterotopic pregnancy.  Patient's vital signs are concerning, heart rate 

has been 100 and blood pressure has been 90s over 50s.  Consult placed to Dr. Soni who will see and admit the patient over at [place].


[]





Dragon Disclaimer:


Dragon Disclaimer:


This electronic medical record was generated, in whole or in part, using a voice

 recognition dictation system.





Departure


Departure:


Impression:  


   Primary Impression:  


   Abdominal pain during pregnancy in first trimester


Disposition:  [facility]


Condition:  GUARDED


Referrals:  


NON,STAFF (PCP)











JESSY GOMEZ MD            Dec 24, 2021 13:06